# Patient Record
Sex: FEMALE
[De-identification: names, ages, dates, MRNs, and addresses within clinical notes are randomized per-mention and may not be internally consistent; named-entity substitution may affect disease eponyms.]

---

## 2019-10-26 ENCOUNTER — HOSPITAL ENCOUNTER (OUTPATIENT)
Dept: HOSPITAL 88 - OR | Age: 34
Discharge: HOME | End: 2019-10-26
Attending: INTERNAL MEDICINE
Payer: COMMERCIAL

## 2019-10-26 VITALS — DIASTOLIC BLOOD PRESSURE: 66 MMHG | SYSTOLIC BLOOD PRESSURE: 109 MMHG

## 2019-10-26 DIAGNOSIS — K29.70: ICD-10-CM

## 2019-10-26 DIAGNOSIS — K64.8: ICD-10-CM

## 2019-10-26 DIAGNOSIS — R19.7: ICD-10-CM

## 2019-10-26 DIAGNOSIS — K63.5: ICD-10-CM

## 2019-10-26 DIAGNOSIS — K21.0: Primary | ICD-10-CM

## 2019-10-26 DIAGNOSIS — Z79.84: ICD-10-CM

## 2019-10-26 DIAGNOSIS — K50.90: ICD-10-CM

## 2019-10-26 DIAGNOSIS — K22.2: ICD-10-CM

## 2019-10-26 DIAGNOSIS — K51.50: ICD-10-CM

## 2019-10-26 DIAGNOSIS — E11.9: ICD-10-CM

## 2019-10-26 DIAGNOSIS — R13.10: ICD-10-CM

## 2019-10-26 DIAGNOSIS — K62.89: ICD-10-CM

## 2019-10-26 DIAGNOSIS — I10: ICD-10-CM

## 2019-10-26 LAB — LACTOFERRIN STL QL: POSITIVE

## 2019-10-26 PROCEDURE — 45380 COLONOSCOPY AND BIOPSY: CPT

## 2019-10-26 PROCEDURE — 43450 DILATE ESOPHAGUS 1/MULT PASS: CPT

## 2019-10-26 PROCEDURE — 87045 FECES CULTURE AEROBIC BACT: CPT

## 2019-10-26 PROCEDURE — 45378 DIAGNOSTIC COLONOSCOPY: CPT

## 2019-10-26 PROCEDURE — 45385 COLONOSCOPY W/LESION REMOVAL: CPT

## 2019-10-26 PROCEDURE — 87328 CRYPTOSPORIDIUM AG IA: CPT

## 2019-10-26 PROCEDURE — 45384 COLONOSCOPY W/LESION REMOVAL: CPT

## 2019-10-26 PROCEDURE — 93005 ELECTROCARDIOGRAM TRACING: CPT

## 2019-10-26 PROCEDURE — 43239 EGD BIOPSY SINGLE/MULTIPLE: CPT

## 2019-10-26 PROCEDURE — 87177 OVA AND PARASITES SMEARS: CPT

## 2019-10-26 PROCEDURE — 82948 REAGENT STRIP/BLOOD GLUCOSE: CPT

## 2019-10-26 PROCEDURE — 83993 ASSAY FOR CALPROTECTIN FECAL: CPT

## 2019-10-26 PROCEDURE — 83630 LACTOFERRIN FECAL (QUAL): CPT

## 2019-10-26 PROCEDURE — 87493 C DIFF AMPLIFIED PROBE: CPT

## 2019-10-26 PROCEDURE — 36415 COLL VENOUS BLD VENIPUNCTURE: CPT

## 2019-10-26 NOTE — OPERATIVE REPORT
DATE OF PROCEDURE:  10/26/2019

 

SURGEON:  Nilton Bryant MD

 

PROCEDURES:  EGD with biopsies and esophageal dilatation and colonoscopy with

polypectomy and biopsies. 

 

INDICATIONS FOR EGD:  Dysphagia, acid reflux.

 

INDICATIONS FOR COLONOSCOPY:  Chronic diarrhea, fecal urgency.

 

MEDICATIONS:  The patient was done under MAC, please see anesthesiologist's note.

 

PROCEDURE IN DETAIL:  With the patient in left lateral decubitus position, a flexible

fiberoptic Olympus gastroscope was introduced into the esophagus under direct

visualization without any difficulty.  There was some patchy erythema noted in distal

esophagus.  A mild stricture was noted at the GE junction that was dilated to size

54-T[place] Smith.  The scope was then advanced with ease into the stomach.  Mucosa

overlying the antrum and the body revealed some diffuse erythema and low-grade to

moderate edema and biopsies were obtained and sent to stain for H pylori.  The pylorus

was of normal contour and shape and was intubated with ease and the scope was advanced

all the way to the second portion of the duodenum.  Biopsies were obtained from the

proximal second portion and duodenal bulb to rule out sprue.  The scope was then

withdrawn back into the stomach and retroflexed.  The mucosa overlying the fundus and

cardia appeared to be within normal limits.  The scope was then straightened out and was

subsequently withdrawn.  The patient tolerated the procedure well. 

 

IMPRESSION:  

1. Distal esophagitis.

2. Esophageal stricture dilated to size 54-[place] Smith.

3. Gastritis, biopsied.  Biopsies sent to stain for Helicobacter pylori.

4. Rule out sprue.

 

PLAN:  Follow up histology.  Increase Protonix to 40 mg 1 p.o. before meals b.i.d.   

 

The patient was then turned around.  After adequate lubrication of the anal canal, a

flexible fiberoptic Olympus colonoscope was inserted into the rectum with ease and

advanced all the way to the cecum.  Prep overall was suboptimal primarily in the right

colon, but visualization was fair.  One minute polyp was removed per the hot biopsy

forceps in the cecum and the site was hemoclipped.  The ileocecal valve was intubated

and the scope was advanced into the terminal ileum.  Biopsies were obtained.  The scope

was then withdrawn back into the colon.  It was then withdrawn slowly and whatever was

visualized the mucosa overlying the ascending and transverse appeared to be within

normal limits.  There were some mild patchy inflammatory changes noted in the left

colon.  Random biopsies were obtained.  Similar findings were noted in the rectum and

also biopsies were obtained.  The scope was then retroflexed into the distal rectum and

small internal hemorrhoids were noted, none of which was actively bleeding.  The scope

was then straightened out and it was subsequently withdrawn after securing an adequate

stool specimen that was sent for the appropriate stool studies.  The patient tolerated

the procedure well. 

 

IMPRESSION:  

1. Cecal polyp, hot biopsied, site hemoclipped.

2. Mild patchy left-sided colitis.

3. Proctitis, mild.

4. Internal hemorrhoids, none actively bleeding.

 

PLAN:  Follow up histology.  Follow up stool studies.  Initiate Bentyl 20 mg 1 p.o.

t.i.d.  VSL#3 one p.o. daily. 

 

 

 

 

______________________________

Nilton Bryant MD

 

[facility] [place]/[facility]

D:  10/26/2019 16:07:24

T:  10/26/2019 23:33:44

Job #:  [id number]

## 2019-10-27 LAB — C DIFFICILE TOXIN A&B AMP PROB: NEGATIVE

## 2021-12-18 ENCOUNTER — HOSPITAL ENCOUNTER (OUTPATIENT)
Dept: HOSPITAL 88 - OR | Age: 36
Discharge: HOME | End: 2021-12-18
Attending: INTERNAL MEDICINE
Payer: COMMERCIAL

## 2021-12-18 VITALS — DIASTOLIC BLOOD PRESSURE: 91 MMHG | SYSTOLIC BLOOD PRESSURE: 132 MMHG

## 2021-12-18 DIAGNOSIS — Z91.041: ICD-10-CM

## 2021-12-18 DIAGNOSIS — K64.8: ICD-10-CM

## 2021-12-18 DIAGNOSIS — Z79.84: ICD-10-CM

## 2021-12-18 DIAGNOSIS — K21.9: ICD-10-CM

## 2021-12-18 DIAGNOSIS — R06.83: ICD-10-CM

## 2021-12-18 DIAGNOSIS — Z86.010: ICD-10-CM

## 2021-12-18 DIAGNOSIS — Z80.0: ICD-10-CM

## 2021-12-18 DIAGNOSIS — K20.90: Primary | ICD-10-CM

## 2021-12-18 DIAGNOSIS — R06.09: ICD-10-CM

## 2021-12-18 DIAGNOSIS — Z88.8: ICD-10-CM

## 2021-12-18 DIAGNOSIS — Z01.810: ICD-10-CM

## 2021-12-18 DIAGNOSIS — Z88.4: ICD-10-CM

## 2021-12-18 DIAGNOSIS — F41.9: ICD-10-CM

## 2021-12-18 DIAGNOSIS — K31.89: ICD-10-CM

## 2021-12-18 DIAGNOSIS — E11.9: ICD-10-CM

## 2021-12-18 DIAGNOSIS — K52.9: ICD-10-CM

## 2021-12-18 DIAGNOSIS — K62.89: ICD-10-CM

## 2021-12-18 DIAGNOSIS — Z01.812: ICD-10-CM

## 2021-12-18 DIAGNOSIS — K29.50: ICD-10-CM

## 2021-12-18 DIAGNOSIS — Z79.899: ICD-10-CM

## 2021-12-18 DIAGNOSIS — K92.0: ICD-10-CM

## 2021-12-18 DIAGNOSIS — E78.00: ICD-10-CM

## 2021-12-18 DIAGNOSIS — I10: ICD-10-CM

## 2021-12-18 DIAGNOSIS — Z20.822: ICD-10-CM

## 2021-12-18 PROCEDURE — 93005 ELECTROCARDIOGRAM TRACING: CPT

## 2021-12-18 PROCEDURE — 43239 EGD BIOPSY SINGLE/MULTIPLE: CPT

## 2021-12-18 PROCEDURE — 45378 DIAGNOSTIC COLONOSCOPY: CPT

## 2021-12-18 PROCEDURE — 36415 COLL VENOUS BLD VENIPUNCTURE: CPT

## 2021-12-18 PROCEDURE — 45380 COLONOSCOPY AND BIOPSY: CPT

## 2021-12-18 PROCEDURE — 81025 URINE PREGNANCY TEST: CPT

## 2021-12-18 PROCEDURE — 82948 REAGENT STRIP/BLOOD GLUCOSE: CPT

## 2021-12-18 PROCEDURE — 43450 DILATE ESOPHAGUS 1/MULT PASS: CPT

## 2025-02-28 ENCOUNTER — HOSPITAL ENCOUNTER (OUTPATIENT)
Dept: HOSPITAL 88 - OR | Age: 40
Discharge: HOME | End: 2025-02-28
Attending: INTERNAL MEDICINE
Payer: COMMERCIAL

## 2025-02-28 VITALS
HEART RATE: 69 BPM | DIASTOLIC BLOOD PRESSURE: 74 MMHG | OXYGEN SATURATION: 100 % | RESPIRATION RATE: 16 BRPM | SYSTOLIC BLOOD PRESSURE: 118 MMHG

## 2025-02-28 DIAGNOSIS — K20.90: Primary | ICD-10-CM

## 2025-02-28 DIAGNOSIS — K29.50: ICD-10-CM

## 2025-02-28 DIAGNOSIS — K80.20: ICD-10-CM

## 2025-02-28 DIAGNOSIS — Z88.4: ICD-10-CM

## 2025-02-28 DIAGNOSIS — K44.9: ICD-10-CM

## 2025-02-28 DIAGNOSIS — Z91.041: ICD-10-CM

## 2025-02-28 DIAGNOSIS — Z01.810: ICD-10-CM

## 2025-02-28 DIAGNOSIS — K64.8: ICD-10-CM

## 2025-02-28 DIAGNOSIS — Z98.84: ICD-10-CM

## 2025-02-28 DIAGNOSIS — I10: ICD-10-CM

## 2025-02-28 DIAGNOSIS — E11.9: ICD-10-CM

## 2025-02-28 DIAGNOSIS — K63.5: ICD-10-CM

## 2025-02-28 DIAGNOSIS — F17.290: ICD-10-CM

## 2025-02-28 DIAGNOSIS — Z88.8: ICD-10-CM

## 2025-02-28 DIAGNOSIS — E88.2: ICD-10-CM

## 2025-02-28 DIAGNOSIS — D64.9: ICD-10-CM

## 2025-02-28 DIAGNOSIS — K21.9: ICD-10-CM

## 2025-02-28 PROCEDURE — 93005 ELECTROCARDIOGRAM TRACING: CPT

## 2025-02-28 PROCEDURE — 45378 DIAGNOSTIC COLONOSCOPY: CPT

## 2025-02-28 PROCEDURE — 43239 EGD BIOPSY SINGLE/MULTIPLE: CPT

## 2025-02-28 PROCEDURE — 45385 COLONOSCOPY W/LESION REMOVAL: CPT

## 2025-02-28 PROCEDURE — 43450 DILATE ESOPHAGUS 1/MULT PASS: CPT

## 2025-02-28 RX ADMIN — SODIUM CHLORIDE, POTASSIUM CHLORIDE, SODIUM LACTATE AND CALCIUM CHLORIDE ONE ML/HR: 600; 310; 30; 20 INJECTION, SOLUTION INTRAVENOUS at 06:32

## 2025-03-20 LAB
ALBUMIN SERPL-MCNC: 4 G/DL (ref 3.5–5)
ALBUMIN/GLOB SERPL: 1.2 {RATIO} (ref 0.8–2)
ALP SERPL-CCNC: 36 IU/L (ref 40–150)
ALT SERPL-CCNC: 12 IU/L (ref 0–55)
ANION GAP SERPL CALC-SCNC: 13.9 MMOL/L (ref 8–16)
BASOPHILS # BLD AUTO: 0.1 10*3/UL (ref 0–0.1)
BASOPHILS NFR BLD AUTO: 0.7 % (ref 0–1)
BILIRUB SERPL-MCNC: 0.4 MG/DL (ref 0.2–1.2)
BUN SERPL-MCNC: 14 MG/DL (ref 7–26)
BUN/CREAT SERPL: 19 (ref 6–25)
CALCIUM SERPL-MCNC: 9.1 MG/DL (ref 8.4–10.2)
CHLORIDE SERPL-SCNC: 103 MMOL/L (ref 98–107)
CO2 SERPL-SCNC: 25 MMOL/L (ref 22–29)
DEPRECATED NEUTROPHILS # BLD AUTO: 4 10*3/UL (ref 2.1–6.9)
EGFRCR SERPLBLD CKD-EPI 2021: 103 ML/MIN (ref 60–?)
EOSINOPHIL # BLD AUTO: 0.2 10*3/UL (ref 0–0.4)
EOSINOPHIL NFR BLD AUTO: 2.5 % (ref 0–6)
ERYTHROCYTE [DISTWIDTH] IN CORD BLOOD: 13.3 % (ref 11.7–14.4)
GLOBULIN PLAS-MCNC: 3.4 G/DL (ref 2.3–3.5)
GLUCOSE SERPLBLD-MCNC: 147 MG/DL (ref 74–118)
HCT VFR BLD AUTO: 39.2 % (ref 34.2–44.1)
HGB BLD-MCNC: 13.1 G/DL (ref 12–16)
LYMPHOCYTES # BLD: 2.6 10*3/UL (ref 1–3.2)
LYMPHOCYTES NFR BLD AUTO: 36.4 % (ref 18–39.1)
MCH RBC QN AUTO: 29.2 PG (ref 28–32)
MCHC RBC AUTO-ENTMCNC: 33.4 G/DL (ref 31–35)
MCV RBC AUTO: 87.3 FL (ref 81–99)
MONOCYTES # BLD AUTO: 0.3 10*3/UL (ref 0.2–0.8)
MONOCYTES NFR BLD AUTO: 4.5 % (ref 4.4–11.3)
NEUTS SEG NFR BLD AUTO: 55.6 % (ref 38.7–80)
PLATELET # BLD AUTO: 361 X10E3/UL (ref 140–360)
POTASSIUM SERPL-SCNC: 3.9 MMOL/L (ref 3.5–5.1)
PROT SERPL-MCNC: 7.4 G/DL (ref 6.5–8.1)
RBC # BLD AUTO: 4.49 X10E6/UL (ref 3.6–5.1)
SODIUM SERPL-SCNC: 138 MMOL/L (ref 136–145)
WBC # BLD: 7.11 X10E3/UL (ref 4.8–10.8)

## 2025-03-21 ENCOUNTER — HOSPITAL ENCOUNTER (OUTPATIENT)
Dept: HOSPITAL 88 - OR | Age: 40
Discharge: HOME | End: 2025-03-21
Attending: SURGERY
Payer: COMMERCIAL

## 2025-03-21 VITALS
DIASTOLIC BLOOD PRESSURE: 86 MMHG | RESPIRATION RATE: 18 BRPM | HEART RATE: 61 BPM | SYSTOLIC BLOOD PRESSURE: 162 MMHG | OXYGEN SATURATION: 98 %

## 2025-03-21 DIAGNOSIS — Z88.6: ICD-10-CM

## 2025-03-21 DIAGNOSIS — Z88.8: ICD-10-CM

## 2025-03-21 DIAGNOSIS — K80.10: Primary | ICD-10-CM

## 2025-03-21 DIAGNOSIS — K21.9: ICD-10-CM

## 2025-03-21 DIAGNOSIS — F17.290: ICD-10-CM

## 2025-03-21 DIAGNOSIS — Z01.812: ICD-10-CM

## 2025-03-21 DIAGNOSIS — E11.9: ICD-10-CM

## 2025-03-21 DIAGNOSIS — E66.3: ICD-10-CM

## 2025-03-21 DIAGNOSIS — I10: ICD-10-CM

## 2025-03-21 DIAGNOSIS — D64.9: ICD-10-CM

## 2025-03-21 PROCEDURE — 88304 TISSUE EXAM BY PATHOLOGIST: CPT

## 2025-03-21 PROCEDURE — 85025 COMPLETE CBC W/AUTO DIFF WBC: CPT

## 2025-03-21 PROCEDURE — 80053 COMPREHEN METABOLIC PANEL: CPT

## 2025-03-21 PROCEDURE — 47562 LAPAROSCOPIC CHOLECYSTECTOMY: CPT

## 2025-03-21 PROCEDURE — 36415 COLL VENOUS BLD VENIPUNCTURE: CPT

## 2025-03-21 RX ADMIN — SODIUM CHLORIDE, POTASSIUM CHLORIDE, SODIUM LACTATE AND CALCIUM CHLORIDE ONE ML/HR: 600; 310; 30; 20 INJECTION, SOLUTION INTRAVENOUS at 06:55

## 2025-03-21 RX ADMIN — SODIUM CHLORIDE ONE: 9 INJECTION, SOLUTION INTRAVENOUS at 06:55

## 2025-03-21 RX ADMIN — FENTANYL CITRATE ONE MCG: 50 INJECTION INTRAMUSCULAR; INTRAVENOUS at 08:45

## 2025-03-21 RX ADMIN — HYDROCODONE BITARTRATE AND ACETAMINOPHEN ONE EA: 5; 325 TABLET ORAL at 09:40

## 2025-03-21 RX ADMIN — SODIUM CHLORIDE ONE MG: 900 INJECTION INTRAVENOUS at 09:00

## 2025-03-21 RX ADMIN — METOCLOPRAMIDE ONE MG: 5 INJECTION, SOLUTION INTRAMUSCULAR; INTRAVENOUS at 09:00
